# Patient Record
Sex: FEMALE | Race: BLACK OR AFRICAN AMERICAN
[De-identification: names, ages, dates, MRNs, and addresses within clinical notes are randomized per-mention and may not be internally consistent; named-entity substitution may affect disease eponyms.]

---

## 2017-02-11 ENCOUNTER — HOSPITAL ENCOUNTER (EMERGENCY)
Dept: HOSPITAL 25 - ED | Age: 27
Discharge: HOME | End: 2017-02-11
Payer: SELF-PAY

## 2017-02-11 VITALS — DIASTOLIC BLOOD PRESSURE: 71 MMHG | SYSTOLIC BLOOD PRESSURE: 138 MMHG

## 2017-02-11 DIAGNOSIS — M54.9: Primary | ICD-10-CM

## 2017-02-11 DIAGNOSIS — R06.02: ICD-10-CM

## 2017-02-11 DIAGNOSIS — F17.210: ICD-10-CM

## 2017-02-11 PROCEDURE — 99282 EMERGENCY DEPT VISIT SF MDM: CPT

## 2017-02-11 PROCEDURE — 81003 URINALYSIS AUTO W/O SCOPE: CPT

## 2017-02-11 PROCEDURE — 96372 THER/PROPH/DIAG INJ SC/IM: CPT

## 2017-02-11 NOTE — ED
Back Pain





- HPI Summary


HPI Summary: 





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 

a long shift overnight at the hospital.  States pain has been present for a few 

days, but became worse last night.  Denies urinary symptoms. Pain is worse 

while arms are raised and standing and radiates to the left rib area. Improved 

with sitting.  Sensitive to the touch.  Pain is achy and stabbing 

intermittently.  Denies chest pain or pressure.  Denies fever.  Denies 

abdominal pain or PMHx of kidney stones or UTI.  Denies numbness or tingling in 

her arms.  Denies trauma, recent surgeries, recent travel. Patient not on BC.





- History of Current Complaint


Chief Complaint: EDBackInjuryPain


Stated Complaint: LT SIDE FLANK PAIN


Time Seen by Provider: 02/11/17 07:31


Hx Obtained From: Patient


Onset/Duration: Gradual Onset


Onset/Duration: Started Days Ago


Timing: Intermittent


Back Pain Location: Is Discrete @ - left sided thoracic back


Severity Initially: Moderate


Severity Currently: Moderate


Pain Intensity: 9


Pain Scale Used: 0-10 Numeric


Character: Sharp, Aching


Aggravating Symptom(s): Movement, Lifting


Alleviating Symptom(s): Rest, Position


Associated Signs And Symptoms: Positive: Negative





- Risk Factors


AAA Risk Factors: Negative


TAD Risk Factors: Negative


Cauda Equina Risk Factors: Negative


Epidural Abscess Risk Factors: Negative





- Allergies/Home Medications


Allergies/Adverse Reactions: 


 Allergies











Allergy/AdvReac Type Severity Reaction Status Date / Time


 


No Known Allergies Allergy   Verified 12/23/14 11:13














PMH/Surg Hx/FS Hx/Imm Hx


Previously Healthy: Yes


Psychiatric History: Reports: Hx Anxiety, Hx Depression


Infectious Disease History: No


Infectious Disease History: 


   Denies: Traveled Outside the [place] in Last 30 Days





- Social History


Occupation: Employed Full-time


Lives: With Family


Alcohol Use: None


Substance Use Type: Reports: None


Smoking Status (MU): Light Every Day Tobacco Smoker


Type: Cigarettes


Have You Smoked in the Last Year: Yes





Review of Systems


Constitutional: Negative


Eyes: Negative


Cardiovascular: Negative


Positive: Shortness Of Breath - feels does not want to take a breath d/t back 

pain


Gastrointestinal: Negative


Genitourinary: Negative


Positive: Myalgia - discrete at left thoracic side, Decreased ROM - unable to 

raise arms bilaterally d/t pain in back


Skin: Negative


Neurological: Negative


Psychological: Normal


All Other Systems Reviewed And Are Negative: Yes





Physical Exam


Triage Information Reviewed: Yes


Vital Signs On Initial Exam: 


 Initial Vitals











Temp Pulse Resp BP Pulse Ox


 


 97.8 F   92   16   147/79   99 


 


 02/11/17 07:23  02/11/17 07:23  02/11/17 07:23  02/11/17 07:23  02/11/17 07:23











Vital Signs Reviewed: Yes


Appearance: Positive: Well-Appearing, Well-Nourished, Pain Distress


Skin: Positive: Warm


Head/Face: Positive: Normal Head/Face Inspection


Eyes: Positive: EOMI


Neck: Positive: Supple, Nontender


Respiratory/Lung Sounds: Positive: Clear to Auscultation


Cardiovascular: Positive: Normal


Bowel Sounds: Positive: Present


Musculoskeletal: Positive: Limited @ - left sided thoracic area - unable to 

raise arms bilaterally, Pain @ - left thoracic back


Neurological: Positive: Sensory/Motor Intact, Reflexes Intact, Speech Normal


Psychiatric: Positive: Normal


AVPU Assessment: Alert





Diagnostics





- Vital Signs


 Vital Signs











  Temp Pulse Resp BP Pulse Ox


 


 02/11/17 07:23  97.8 F  92  16  147/79  99














- Laboratory


Lab Statement: Any lab studies that have been ordered have been reviewed, and 

results considered in the medical decision making process.





Back Pain Course/Dx





- Course


Course Of Treatment: UA WNL.  Pain reproducible on palpation of area and during 

physical exam while raising arms.  This pain is likely muscular.  Patient given 

toradol with improvement of symptoms.  Rx for flexiril and follow up with PCP.  

Patient agrees with plan.





- Diagnoses


Differential Diagnosis/HQI/PQRI: Positive: Herniated Disc, Strain, Sprain


Provider Diagnoses: 


 Muscle strain of left upper back








Images





- Images


Full Body (No Head): 


  __________________________














  __________________________





 1 - pain, tightness, tenderness on palpation








Discharge





- Discharge Plan


Condition: Stable


Disposition: HOME


Patient Education Materials:  Muscle Strain (ED)


Additional Instructions: 


Dx. Muscle Strain


Flexeril:  This medication is a muscle relaxant and can help relieve muscle 

spasms, muscle strain, or pain sensations.  Flexeril can cause side effects 

that may impair your thinking or reactions. Be careful if you drive or do 

anything that requires you to be awake and alert. Avoid drinking alcohol, which 

can increase some of the side effects of Flexeril.





Ibuprofen 600mg three times daily with meals as needed for pain and 

inflammation. 





Heating pad to the area (preferably moist heat) several times per day until 

discomfort has subsided.





Follow up with your PCP.

## 2017-06-14 ENCOUNTER — HOSPITAL ENCOUNTER (EMERGENCY)
Dept: HOSPITAL 25 - UCEAST | Age: 27
Discharge: HOME | End: 2017-06-14
Payer: SELF-PAY

## 2017-06-14 VITALS — DIASTOLIC BLOOD PRESSURE: 78 MMHG | SYSTOLIC BLOOD PRESSURE: 139 MMHG

## 2017-06-14 DIAGNOSIS — B97.89: ICD-10-CM

## 2017-06-14 DIAGNOSIS — J02.8: Primary | ICD-10-CM

## 2017-06-14 DIAGNOSIS — F17.210: ICD-10-CM

## 2017-06-14 PROCEDURE — 87651 STREP A DNA AMP PROBE: CPT

## 2017-06-14 PROCEDURE — G0463 HOSPITAL OUTPT CLINIC VISIT: HCPCS

## 2017-06-14 PROCEDURE — 99212 OFFICE O/P EST SF 10 MIN: CPT

## 2017-06-14 NOTE — UC
Throat Pain/Nasal Christopher HPI





- HPI Summary


HPI Summary: 





28 y/o female presents to the clinic c/o of  RT jaw pain and sore throat for 

the past 2 days.  Pt denies fever , cough, SOB, N/V/D.  chest pain, sick contact

, abdominal pain.  Patient states her pain is 7/10 and is difficulty to swallow 

and associated with RT ear pain.








- History of Current Complaint


Hx Obtained From: Patient


Hx Last Menstrual Period: 6/14/17


Pregnant?: No


Onset/Duration: Gradual Onset, Lasting Days, Still Present


Severity: Moderate


Pain Intensity: 7


Pain Scale Used: 0-10 Numeric


Associated Signs & Symptoms: Positive: Dysphagia, Other - RT jaw pain and ear 

pain.  Negative: Sinus Discomfort, Nasal Discharge, Fever, Vomiting, Rash





- Epiglottits Risk Factors


Epiglottis Risk Factors: Negative





<Eva Plunkett - Last Filed: 06/14/17 11:06>





<Katelyn Cruz - Last Filed: 06/14/17 14:35>





- History of Current Complaint


Chief Complaint: UCEar


Stated Complaint: EAR COMPLAINT


Time Seen by Provider: 06/14/17 10:26





- Allergies/Home Medications


Allergies/Adverse Reactions: 


 Allergies











Allergy/AdvReac Type Severity Reaction Status Date / Time


 


No Known Allergies Allergy   Verified 06/14/17 08:54














PMH/Surg Hx/FS Hx/Imm Hx


Previously Healthy: Yes





- Surgical History


Surgical History: None





- Family History


Known Family History: Positive: Cardiac Disease, Hypertension





- Social History


Occupation: Employed Full-time


Lives: With Family


Alcohol Use: Occasionally


Substance Use Type: None


Smoking Status (MU): Light Every Day Tobacco Smoker


Type: Cigarettes


Amount Used/How Often: 4-5 CIG/DAY


Have You Smoked in the Last Year: Yes


Household Exposure Type: Cigarettes





- Immunization History


Most Recent Influenza Vaccination: FALL 2016


Most Recent Tetanus Shot: 10/3/14


Most Recent Pneumonia Vaccination: never





<Eva Plunkett - Last Filed: 06/14/17 11:06>





Review of Systems


Constitutional: Negative


Skin: Negative


Eyes: Negative


ENT: Sore Throat, Ear Ache


Respiratory: Negative


Cardiovascular: Negative


Gastrointestinal: Negative


Genitourinary: Negative


Motor: Negative


Neurovascular: Negative


Musculoskeletal: Negative


Neurological: Negative


Psychological: Negative


All Other Systems Reviewed And Are Negative: Yes





<Eva Plunkett - Last Filed: 06/14/17 11:06>





Physical Exam


Triage Information Reviewed: Yes


Vital Signs: 


 Initial Vital Signs











Temp  98.8 F   06/14/17 08:55


 


Pulse  78   06/14/17 08:55


 


Resp  16   06/14/17 08:55


 


BP  130/74   06/14/17 08:55


 


Pulse Ox  99   06/14/17 08:55














- Additional Comments





Vital signs reviewed


Physical exam:


General: Well developed, well-nourished patient with NAD.


Head and face: Normocephalic and atraumatic


Eyes: PERRLA, EOMI x 2. Normal conjunctiva. No eye discharge.


ENT: Ears and TM with normal limits. 


Nose: with pink mucosa and no nasal discharge. Pharynx with  mild erythema, no 

exudate. 


Neck: Supple, no JVD, no carotid bruits and positive RT anterior cervical 

lymphadenopathy swollen and and tender to palpation..


Lungs: clear, no rales, no rhonchi, no wheezes.


CVS: RRR, S1 and S2 present no murmurs or gallops appreciated.


Abdomen: soft nontender with positive bowel sounds.


Extremities: no edema noted.


Neuro: WNL. 











<Eva Plunkett - Last Filed: 06/14/17 11:06>


Vital Signs: 


 Initial Vital Signs











Temp  98.8 F   06/14/17 08:55


 


Pulse  78   06/14/17 08:55


 


Resp  16   06/14/17 08:55


 


BP  130/74   06/14/17 08:55


 


Pulse Ox  99   06/14/17 08:55














<Katelyn Cruz - Last Filed: 06/14/17 14:35>





Throat Pain/Nasal Course/Dx





- Course


Course Of Treatment: Soret throat with RT jaw pain and ear pain: Hx obtained.  

PE abnormal finding: Nose: with pink mucosa and no nasal discharge. Pharynx 

with  mild erythema, no exudate.  Neck: Supple, no JVD, no carotid bruits and 

positive RT anterior cervical lymphadenopathy swollen and and tender to 

palpation..  Rapid strep ordered.  Results: negative.  Pt requested Ibuprofen 

at the clinic since for pain.  Pt tolerated well medicationa and pain decrease. 

4/10.  PT advised to Increase fluid intake and rest. Use Ibuprofen with the 

stomach full to decrease inflammation, and alleviate symptoms. Return to the 

clinic or call your PCP if symptoms do not improve. Pt understood and agreed





- Differential Dx/Diagnosis


Differential Diagnosis/HQI/PQRI: Laryngitis, Otitis Media, Peritonsillar Abscess

, Pharyngitis, Tonsillitis, URI


Provider Diagnoses: viral pharyngitis.





<Eva Plunkett - Last Filed: 06/14/17 11:06>





Discharge





<Eva Plunkett - Last Filed: 06/14/17 11:06>





<Katelyn Cruz - Last Filed: 06/14/17 14:35>





- Discharge Plan


Condition: Stable


Disposition: HOME


Prescriptions: 


Ibuprofen TAB* [Motrin TAB* 800 MG] 800 mg PO Q6H #20 tab


Patient Education Materials:  Pharyngitis (ED)


Referrals: 


No Primary Care Phys,NOPCP [Primary Care Provider] - 


[facility] [place] [facility] [Outside]


Additional Instructions: 


Please Increase fluid intake and rest. Use Ibuprofen with the stomach full to 

decrease inflammation, and alleviate symptoms. Return to the clinic or call 

your PCP if symptoms do not improve.

## 2017-10-25 ENCOUNTER — HOSPITAL ENCOUNTER (EMERGENCY)
Dept: HOSPITAL 25 - ED | Age: 27
Discharge: HOME | End: 2017-10-25
Payer: COMMERCIAL

## 2017-10-25 VITALS — SYSTOLIC BLOOD PRESSURE: 124 MMHG | DIASTOLIC BLOOD PRESSURE: 81 MMHG
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
## 2017-10-25 DIAGNOSIS — R11.2: ICD-10-CM

## 2017-10-25 DIAGNOSIS — F32.9: ICD-10-CM
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
## 2017-10-25 DIAGNOSIS — Z32.02: ICD-10-CM

## 2017-10-25 DIAGNOSIS — S06.9X9A: Primary | ICD-10-CM

## 2017-10-25 DIAGNOSIS — F17.210: ICD-10-CM

## 2017-10-25 DIAGNOSIS — R51: ICD-10-CM

## 2017-10-25 DIAGNOSIS — F41.9: ICD-10-CM

## 2017-10-25 DIAGNOSIS — S00.93XA: ICD-10-CM

## 2017-10-25 LAB
MANUAL ENTRY VERIFICATION: (no result)
UR PREG INTERNAL CONTROL: (no result)
UR PREG KIT LOT#: (no result)

## 2017-10-25 PROCEDURE — 99282 EMERGENCY DEPT VISIT SF MDM: CPT

## 2017-10-25 PROCEDURE — 81025 URINE PREGNANCY TEST: CPT

## 2017-10-25 PROCEDURE — 70450 CT HEAD/BRAIN W/O DYE: CPT

## 2017-10-25 NOTE — RAD
HISTORY: Head trauma, loss of consciousness



COMPARISONS: January 22, 2012



TECHNIQUE: Multiple contiguous axial CT scans were obtained of the head without 

intravenous contrast. 



FINDINGS: 



The study is limited by patient motion artifact.



HEMORRHAGE/INFARCT: There is no hemorrhage or acute infarct.

MASSES/SHIFT: There is no mass or shift.



EXTRA-AXIAL SPACES: There are no extra-axial fluid collections.

SULCI AND VENTRICLES: The sulci and ventricles are normal in size and position for the

patient's stated age.



CEREBRUM: There are no focal parenchymal abnormalities.

BRAINSTEM: There are no focal parenchymal abnormalities.

CEREBELLUM: There are no focal parenchymal abnormalities.



VESSELS: The vessels are grossly normal.

PARANASAL SINUSES: The paranasal sinuses are clear.

ORBITS: The orbits are unremarkable.

BONES AND SOFT TISSUE: No bone or soft tissue abnormalities are noted.



OTHER: None



IMPRESSION: 

NO ACUTE INTRACRANIAL PATHOLOGY.
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
## 2018-08-24 ENCOUNTER — HOSPITAL ENCOUNTER (EMERGENCY)
Dept: HOSPITAL 25 - ED | Age: 28
LOS: 1 days | Discharge: HOME | End: 2018-08-25
Payer: COMMERCIAL

## 2018-08-24 DIAGNOSIS — Z82.49: ICD-10-CM

## 2018-08-24 DIAGNOSIS — F17.210: ICD-10-CM

## 2018-08-24 DIAGNOSIS — R07.9: Primary | ICD-10-CM

## 2018-08-24 PROCEDURE — 84484 ASSAY OF TROPONIN QUANT: CPT

## 2018-08-24 PROCEDURE — 36415 COLL VENOUS BLD VENIPUNCTURE: CPT

## 2018-08-24 PROCEDURE — 71046 X-RAY EXAM CHEST 2 VIEWS: CPT

## 2018-08-24 PROCEDURE — 93005 ELECTROCARDIOGRAM TRACING: CPT

## 2018-08-24 PROCEDURE — 99283 EMERGENCY DEPT VISIT LOW MDM: CPT

## 2018-08-24 NOTE — ED
HPI Chest Pain





- HPI Summary


HPI Summary: 


28 year old F presenting to [facility] complains of chest pain that began at 17:00 

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 She describes the pain as 

pressure and discomfort. She states the pain radiates to the back. The patient 

rates the pain 8/10 in severity. Symptoms aggravated by stretching and 

movement. Symptoms alleviated by nothing. Patient reports shortness of breath 

and nausea since resolved. She additionally complains of back pain that began 

this morning. Patient is a smoker. She has cardiac FHx.





- History of Current Complaint


Chief Complaint: EDChestPainROMI


Time Seen by Provider: 08/24/18 23:36


Hx Obtained From: Patient


Onset/Duration: Started Hours Ago - 17:00 today, Still Present


Timing: Constant


Current Severity: Severe


Pain Intensity: 8


Pain Scale Used: 0-10 Numeric


Chest Pain Radiates: Yes


Chest Pain Radiates To:: Back


Character: Pressure/Squeezing, Other: - discomfort


Aggravating Factor(s): Movement, Other: - stretching


Alleviating Factor(s): Nothing


Associated Signs and Symptoms: Positive: Other: - shortness of breath and 

nausea since resolved





- Allergy/Home Medications


Allergies/Adverse Reactions: 


 Allergies











Allergy/AdvReac Type Severity Reaction Status Date / Time


 


No Known Allergies Allergy   Verified 08/24/18 23:35














PMH/Surg Hx/FS Hx/Imm Hx


Previously Healthy: No


Endocrine/Hematology History: 


   Denies: Hx Anticoagulant Therapy, Hx Diabetes


Psychiatric History: Reports: Hx Anxiety, Hx Depression





- Immunization History


Date of Influenza Vaccine: 9/2017


Infectious Disease History: No


Infectious Disease History: 


   Denies: Traveled Outside the [place] in Last 30 Days





- Family History


Known Family History: Positive: Cardiac Disease, Hypertension





- Social History


Alcohol Use: Occasionally


Hx Substance Use: No


Substance Use Type: Reports: None


Hx Tobacco Use: Yes


Smoking Status (MU): Light Every Day Tobacco Smoker


Type: Cigarettes


Amount Used/How Often: 4-5 CIG/DAY


Have You Smoked in the Last Year: Yes





Review of Systems


Positive: Chest Pain


Positive: Shortness Of Breath


Positive: Nausea - since resolved


All Other Systems Reviewed And Are Negative: Yes





Physical Exam





- Summary


Physical Exam Summary: 


Appearance: Well-appearing, Well-nourished, lying in bed comfortably


Skin: Warm, dry, no obvious rash


Eyes: sclera anicteric, no conjunctival pallor


ENT: mucous membranes moist, pharynx appears normal


Neck: Supple, nontender


Respiratory: Clear to auscultation, no signs of respiratory distress


Cardiovascular: Normal S1, S2. No murmurs. Normal distal pulses in tibial and 

radial bilaterally.


Abdomen: Soft, nontender, normal active bowel sounds present


Musculoskeletal: Normal, Strength/ROM Intact


Neurological: A&Ox3, awake and alert, mentation is normal, speech is fluent and 

appropriate


Psychiatric: affect is normal, does not appear anxious or depressed


Triage Information Reviewed: Yes


Vital Signs On Initial Exam: 


 Initial Vitals











Temp Pulse Resp BP Pulse Ox


 


 97.6 F   101   22   137/84   98 


 


 08/24/18 22:41  08/24/18 22:41  08/24/18 22:41  08/24/18 22:41  08/24/18 22:41











Vital Signs Reviewed: Yes





Diagnostics





- Vital Signs


 Vital Signs











  Temp Pulse Resp BP Pulse Ox


 


 08/24/18 22:41  97.6 F  101  22  137/84  98














- Laboratory


Lab Statement: Any lab studies that have been ordered have been reviewed, and 

results considered in the medical decision making process.





- Radiology


  ** CXR


Radiology Interpretation Completed By: ED Physician - Normal CXR. Pending 

official report.





- EKG


  ** 2248


Cardiac Rate: Tachycardia - 93 BPM


EKG Rhythm: Sinus Rhythm


EKG Interpretation: Prolonged OH interval





Chest Pain Course/Dx





- Diagnoses


Provider Diagnoses: 


 Chest pain








Discharge





- Sign-Out/Discharge


Documenting (check all that apply): Patient Departure - Discharge





- Discharge Plan


Condition: Good


Disposition: HOME


Patient Education Materials:  Chest Wall Pain (ED)


Referrals: 


[facility] [Outside]


No Primary Care Phys,NOPCP [Primary Care Provider] - 





- Billing Disposition and Condition


Condition: GOOD


Disposition: Home





- Attestation Statements


Document Initiated by Scribe: Yes


Documenting Scribe: Delfina Magñaa


Provider For Whom Jen is Documenting (Include Credential): Narendra Hollingsworth MD


Scribe Attestation: 


Delfina ZELAYA, scribed for Narendra Hollingsworth MD on 08/26/18 at 0209. 


Scribe Documentation Reviewed: Yes


Provider Attestation: 


The documentation as recorded by the scribeDelfina accurately reflects 

the service I personally performed and the decisions made by me, Narendra Hollingswroth MD

## 2018-08-25 VITALS — DIASTOLIC BLOOD PRESSURE: 90 MMHG | SYSTOLIC BLOOD PRESSURE: 131 MMHG

## 2018-08-25 NOTE — RAD
INDICATION: Upper back and chest pain /pressure. History of smoking.



COMPARISON: January 22, 2012



TECHNIQUE:  Dual energy PA and routine lateral views of the chest were obtained.



REPORT: Accounting for superimposed soft tissues and normal bronchovascular markings the

lungs and pleural spaces are clear. Negative for pneumothorax. The heart, pulmonary

vasculature, and mediastinal contours are unremarkable. Unremarkable soft tissue contours

and osseous structures. 



IMPRESSION: 

#. No evidence for acute intrathoracic disease.



R1